# Patient Record
(demographics unavailable — no encounter records)

---

## 2019-01-01 NOTE — HP
- Maternal History


Mother's Age: 17 yo


 Status: 


Mother's Blood Type: O positive


HBSAG: Negative


Date: 18


RPR: Negative


Date: 18


Group B Strep: Negative


HIV: Negative





- Maternal Risks


OB Risks: Suspected IUGR, Teen pregnancy, late regitrant(6+ visits), positive 

Chlamydia  tx SY negative 3/19. Maternal temp in labor 100.7. NRFHR. ROM 

11 hours 18 minutes. Infant entered nursery at 445am.





[place] Data





- Admission


Date of Admission: 19


Admission Time: 04:33


Date of Delivery: 19


Time of Delivery: 04:33


Wks Gestation by Dates: 40.5


Wks Gestation by Sono: 39.1


Infant Gender: Male


Type of Delivery: 


Apgar Score @1 Minute: 8


Apgar score @ 5 Minutes: 9


Birth Weight: 3.36 kg


Birth Length: 48 cm


Head Circumference, Admission: 34


Chest Circumference: 31.5


Abdominal Girth: 32.0





- Vital Signs


  ** Left Upper Arm


Blood Pressure: 67/33





  ** Left Calf


Blood Pressure: 66/28





  ** Right Upper Arm


Blood Pressure: 58/32





  ** Right Calf


Blood Pressure: 67/49





Level 2, History and Physical


 History: 





Ex 39 weeker born vaginally to an 17 yo  mother induced for IUGR.with HIV 

negative, RPR negative, HepB sAg negative, GBS negative, GBS negative, 

chlamydia positive in 1` treated and negative SY; ROM [id number]. Mother 

spiked a fever PTD. Ampicillin given X1. Baby was vigorous at birth, no active 

resuscitation , routine care in L &D. Apgars 8 and 9 at 1 and 5 min of life. 

Baby is AGA. Baby is being admitted to [facility] for R/o sepsis due to maternal 

fever. Initial BGM in [facility] 51.   





- [place] Infant


Birth Weight: 3.36 kg


Birth Length: 48 cm


Vital Signs: 


 Vital Signs











Temperature  37.4 C   19 05:20


 


Pulse Rate  166 H  19 06:00


 


Respiratory Rate  51   19 06:00


 


Blood Pressure  67/33   19 05:20


 


O2 Sat by Pulse Oximetry (%)      











Chest Circumference: 31.5


General Appearance: Yes: No Abnormalities, Well flexed, Full ROM, Spontaneous 

movements


Skin: Yes: No Abnormalities, Other (small lesion left hand- vesicle)


Head: Yes: Molding, Caput


Eyes: Yes: No Abnormalities


Ears: Yes: No Abnormalities


Nose: Yes: No Abnormalities


Mouth: Yes: No Abnormalities


Chest: Yes: No Abnormalities


Lungs/Respiratory: Yes: No Abnormalities, Clear, Bilateral good air entry


Cardiac: Yes: No Abnormalities, S1, S2, Peripheral pulses strong, Capillary 

refill immediat.  No: Murmur


Abdomen: Yes: No Abnormalities, Umb Ves, 2 artery 1 vein


Gastrointestinal: Yes: No Abnormalities


Genitalia: No Abnormalities


Genitalia, Male: Yes: Bilateral testes descended, Penis appears normal


Anus: Yes: No Abnormalities


Extremities: Yes: No Abnormalities


Spine: Yes: No Abnormalities


Reflexes: [place]: Present, Rooting: Present, Sucking: Present


Neuro: Yes: No Abnormalities, Alert, Active


Cry: Yes: No Abnormalities, Strong





Problem List





- Problems


(1) Sepsis in 


Code(s): P36.9 - BACTERIAL SEPSIS OF , UNSPECIFIED   





Assessment/Plan





Ex 39 weeker AGA male, born vaginally to an 17 yo  mother induced for 

IUGR.with HIV negative, RPR negative, HepB sAg negative, GBS negative,  

chlamydia positive in  treated and negative SY; ROM [id number]. Mother 

spiked a fever PTD. Ampicillin given X1. Baby was vigorous at birth, no active 

resuscitation , routine care in L &D. Apgars 8 and 9 at 1 and 5 min of life. 

Baby is AGA. Baby is being admitted to [facility] for R/o sepsis due to maternal 

fever. Initial BGM in [facility] 51. 


Plan :


- continuous cardio-respiratory monitoring


- CBC and blood culture stat and start Amp+ Gent IV ; f/u blood cultures


- Feeds po ad melany with EBM/ Enf 20 perla. 


- Labs : serial CBC, BMP and bili in am .


- Discussed plan with nurses. 


- Mother updated.

## 2019-01-01 NOTE — PN
Neonatology, Progress Note





-  Exam


Last weight documented: 3.305 kg


Chest Circumference: 31.5


Head Circumference: 34


Vital Signs: 


 Vital Signs











Temperature  36.7 C   19 08:30


 


Pulse Rate  120 L  19 08:30


 


Respiratory Rate  34   19 08:30


 


Blood Pressure  65/33   19 08:30


 


O2 Sat by Pulse Oximetry (%)  99   19 08:30











General Appearance: Yes: No Abnormalities, Well flexed, Full ROM, Spontaneous 

movements


Skin: Yes: No Abnormalities, Other


Head: Yes: Molding, Caput


Eyes: Yes: No Abnormalities


Ears: Yes: No Abnormalities


Nose: Yes: No Abnormalities


Mouth: Yes: No Abnormalities


Chest: Yes: No Abnormalities


Lungs/Respiratory: Yes: Clear, Bilateral good air entry


Cardiac: Yes: No Abnormalities, S1, S2, Peripheral pulses strong, Capillary 

refill immediat.  No: Murmur


Abdomen: Yes: No Abnormalities, Umb Ves, 2 artery 1 vein


Gastrointestinal: Yes: No Abnormalities


Genitalia: No Abnormalities


Genitalia, Male: Yes: Bilateral testes descended, Penis appears normal


Anus: Yes: No Abnormalities


Extremities: Yes: No Abnormalities


Spine: Yes: No Abnormalities


Reflexes: [place]: Present, Rooting: Present, Sucking: Present


Neuro: Yes: No Abnormalities, Alert, Active


Cry: No Abnormalities, Strong


Current Medications: 


Active Medications





Ampicillin Sodium (Ampicillin -)  168 mg 50 mg/kg (168 mg) IVPUSH Q12H [facility]


   Last Admin: 19 06:15 Dose:  168 mg


Gentamicin Sulfate (Garamycin *Pediatric Injection* -)  13 mg 4 mg/kg (13 mg) 

IVPB Q24H [facility]


   Last Admin: 19 08:00 Dose:  13 mg








Intake and Output: 


 Intake + Output











 19





 23:59 11:59


 


Intake Total 90 87


 


Output Total 99 113


 


Balance -9 


 


Intake:  


 


  IV  2


 


    saline lock  2


 


  Oral 90 85


 


Output:  


 


  Urine 99 113


 


Other:  


 


  Weight  3.305 kg


 


  Weight Measurement Method  Baby Scale











Labs, Other Data: 


 Baby's Blood Type, Rossy











Cord Blood Type  O POSITIVE   19  05:25    


 


MICKIE, Poly Interpret  Negative  (NEGATIVE)   19  05:25    











Other Findings/Remarks: 


 Baby's Blood Type, Rossy











Cord Blood Type  O POSITIVE   19  05:25    


 


MICKIE, Poly Interpret  Negative  (NEGATIVE)   19  05:25    














Problem List





- Problems


(1) Sepsis in 


Code(s): P36.9 - BACTERIAL SEPSIS OF , UNSPECIFIED   





Assessment/Plan





Ex 39 weeker AGA male, DOl #1, born vaginally to an 17 yo  mother with HIV 

negative, RPR negative, HepB sAg negative, GBS negative,  chlamydia positive in 

 treated and negative SY; ROM [id number]. Mother spiked a fever PTD. 

Ampicillin given X1. Baby was vigorous at birth, no resuscitation , routine 

care in L &D. Apgars 8 and 9 at 1 and 5 min of life. Baby is AGA. Baby was 

admitted to [facility] for R/o sepsis due to maternal fever, on Amp+ Gent, 24h blood 

cultures negative. Feeding po ad melany, voiding and stooling. 


Plan :


- Continue cardio-respiratory monitoring


- CBC acceptable X2. Continue  Amp+ Gent IV ; f/u blood cultures- if negative 

at 48h will d/c antibiotics. 


- Feeds po ad melany with EBM/ Enf 20 perla. 


- BMP acceptable, Bili 7.5/0.2, will repeat bili in am ( both mom and baby 

Opositive with Rossy negative)


- Discussed plan with nurses. 


- Parents updated.

## 2019-01-01 NOTE — DS
- Maternal History


Mother's Age: 19 yo


 Status: 


Mother's Blood Type: O positive


HBSAG: Negative


Date: 18


RPR: Negative


Date: 18


Group B Strep: Negative


HIV: Negative





- Maternal Risks


OB Risks: Suspected IUGR, Teen pregnancy, late regitrant(6+ visits), positive 

Chlamydia  tx SY negative 3/19. Maternal temp in labor 100.7. NRFHR. ROM 

11 hours 18 minutes. Infant entered nursery at 445am.





[place] Data





- Admission


Date of Admission: 19


Admission Time: 04:33


Date of Delivery: 19


Time of Delivery: 04:33


Wks Gestation by Dates: 40.5


Wks Gestation by Sono: 39.1


Infant Gender: Male


Type of Delivery: 


Apgar Score @1 Minute: 8


Apgar score @ 5 Minutes: 9


Birth Weight: 3.36 kg


Birth Length: 48 cm


Head Circumference, Admission: 34


Chest Circumference: 31.5


Abdominal Girth: 33.0





- Hearing Screen


Left Ear: Passed


Right Ear: Passed


Hearing Screen Complete: 19





- Labs


Labs: 


 Baby's Blood Type, Rossy











Cord Blood Type  O POSITIVE   19  05:25    


 


MICKIE, Poly Interpret  Negative  (NEGATIVE)   19  05:25    














- [facility] Screening


 Screening Card Number: [id number]





Neonatology, Discharge





- History of Present Illness


 History: 





Ex 39 weeker born vaginally to an 19 yo  mother induced for IUGR.with HIV 

negative, RPR negative, HepB sAg negative, GBS negative, GBS negative, 

chlamydia positive in 1` treated and negative SY; ROM [id number]. Mother 

spiked a fever PTD. Ampicillin given X1. Baby was vigorous at birth, no active 

resuscitation , routine care in L &D. Apgars 8 and 9 at 1 and 5 min of life. 

Baby is AGA. Baby is being admitted to [facility] for R/o sepsis due to maternal 

fever. Initial BGM in [facility] 51.   





-  Infant


Last Weight Documented: 3.275 kg


Head Circumference (cms): 34


Length: 48.26 cm


General Appearance: Yes: No Abnormalities, Well flexed, Full ROM, Spontaneous 

movements


Skin: Yes: No Abnormalities, Jaundice


Head: Yes: Molding


Eyes: Yes: No Abnormalities, Clear, Pupils equal, KISHA, Red reflex present


Ears: Yes: No Abnormalities


Nose: Yes: No Abnormalities


Mouth: Yes: No Abnormalities


Chest: Yes: No Abnormalities


Lungs/Respiratory: Yes: No Abnormalities


Cardiac: Yes: No Abnormalities, S1, S2, Peripheral pulses strong, Capillary 

refill immediat.  No: Murmur


Abdomen: Yes: No Abnormalities


Gastrointestinal: Yes: No Abnormalities


Genitalia: No Abnormalities


Genitalia, Male: Yes: Bilateral testes descended, Penis appears normal


Anus: Yes: No Abnormalities


Extremities: Yes: No Abnormalities, 10 Fingers, 10 Toes


Spine: Yes: No Abnormalities


Reflexes: [place]: Present, Rooting: Present


Neuro: Yes: No Abnormalities, Alert, Active


Cry: Yes: No Abnormalities, Strong





Discharge Summary


Reason For Visit: 


Current Active Problems





Sepsis in  (Acute)








Hospital Course: 





Ex 39 weeker AGA male, born vaginally to an 19 yo  mother with HIV negative

, RPR negative, HepB sAg negative, GBS negative,  chlamydia positive in  

treated and negative SY; ROM [id number]. Mother spiked a fever PTD. Ampicillin 

given X1. Baby was vigorous at birth, no resuscitation , routine care in L &D. 

Apgars 8 and 9 at 1 and 5 min of life. Baby is AGA. Baby was admitted to [facility] 

for R/o sepsis due to maternal fever.


- On continuous cardio-respiratory monitoring. no issues, stable on room air, 

no A's, B's or desats


- CBC acceptable X2. On Ampicillin+ Gentamycin X48h . Blood cultures negative 

at 48h. 


- Feeds po ad melany with EBM/ Enf 20 perla. Good po intake, no issues. Voiding and 

stooling. 


- BMP acceptable, Bili 7.5/0.2 on DOl #1, and repeat 10/0.3 on DOL #2, no photo

, both mom and baby O positive with Rossy negative)





Condition: Good





- Instructions


Diet, Activity, Other Instructions: 


Continue feeds po ad melany with EBM/ 20 perla formula with a min of 45 ml Q3h . 

Encourage breastfeeding. 


F/u with pediatrician , Dr. Cruz Rios on 19 . 





Disposition: HOME